# Patient Record
Sex: MALE | Race: WHITE | NOT HISPANIC OR LATINO | Employment: OTHER | URBAN - METROPOLITAN AREA
[De-identification: names, ages, dates, MRNs, and addresses within clinical notes are randomized per-mention and may not be internally consistent; named-entity substitution may affect disease eponyms.]

---

## 2017-01-23 RX ORDER — ESCITALOPRAM OXALATE 10 MG/1
TABLET ORAL
Qty: 90 TABLET | Refills: 1 | Status: SHIPPED | OUTPATIENT
Start: 2017-01-23 | End: 2017-02-21 | Stop reason: SDUPTHER

## 2017-01-23 RX ORDER — DONEPEZIL HYDROCHLORIDE 10 MG/1
TABLET, FILM COATED ORAL
Qty: 90 TABLET | Refills: 1 | Status: SHIPPED | OUTPATIENT
Start: 2017-01-23 | End: 2017-02-21 | Stop reason: SDUPTHER

## 2017-01-25 RX ORDER — QUETIAPINE FUMARATE 50 MG/1
TABLET, FILM COATED ORAL
Qty: 30 TABLET | Refills: 2 | Status: SHIPPED | OUTPATIENT
Start: 2017-01-25 | End: 2017-02-21 | Stop reason: SDUPTHER

## 2017-01-27 ENCOUNTER — CLINICAL SUPPORT (OUTPATIENT)
Dept: ELECTROPHYSIOLOGY | Facility: CLINIC | Age: 82
End: 2017-01-27
Payer: MEDICARE

## 2017-01-27 DIAGNOSIS — Z95.0 CARDIAC PACEMAKER IN SITU: ICD-10-CM

## 2017-01-27 DIAGNOSIS — I44.2 COMPLETE HEART BLOCK: ICD-10-CM

## 2017-01-27 PROCEDURE — 93280 PM DEVICE PROGR EVAL DUAL: CPT | Mod: S$GLB,,, | Performed by: INTERNAL MEDICINE

## 2017-02-21 ENCOUNTER — OFFICE VISIT (OUTPATIENT)
Dept: FAMILY MEDICINE | Facility: CLINIC | Age: 82
End: 2017-02-21
Payer: MEDICARE

## 2017-02-21 ENCOUNTER — TELEPHONE (OUTPATIENT)
Dept: FAMILY MEDICINE | Facility: CLINIC | Age: 82
End: 2017-02-21

## 2017-02-21 VITALS
HEART RATE: 68 BPM | WEIGHT: 117.19 LBS | DIASTOLIC BLOOD PRESSURE: 76 MMHG | HEIGHT: 70 IN | OXYGEN SATURATION: 95 % | TEMPERATURE: 98 F | BODY MASS INDEX: 16.78 KG/M2 | SYSTOLIC BLOOD PRESSURE: 124 MMHG

## 2017-02-21 DIAGNOSIS — G30.1 LATE ONSET ALZHEIMER'S DISEASE WITHOUT BEHAVIORAL DISTURBANCE: Chronic | ICD-10-CM

## 2017-02-21 DIAGNOSIS — F02.80 LATE ONSET ALZHEIMER'S DISEASE WITHOUT BEHAVIORAL DISTURBANCE: Chronic | ICD-10-CM

## 2017-02-21 DIAGNOSIS — F03.91 DEMENTIA WITH BEHAVIORAL DISTURBANCE, UNSPECIFIED DEMENTIA TYPE: ICD-10-CM

## 2017-02-21 DIAGNOSIS — J06.9 VIRAL URI: Primary | ICD-10-CM

## 2017-02-21 DIAGNOSIS — I10 ESSENTIAL HYPERTENSION: Chronic | ICD-10-CM

## 2017-02-21 DIAGNOSIS — Z48.02 ENCOUNTER FOR REMOVAL OF SUTURES: ICD-10-CM

## 2017-02-21 PROCEDURE — 99499 UNLISTED E&M SERVICE: CPT | Mod: S$GLB,,, | Performed by: FAMILY MEDICINE

## 2017-02-21 PROCEDURE — 99214 OFFICE O/P EST MOD 30 MIN: CPT | Mod: S$GLB,,, | Performed by: FAMILY MEDICINE

## 2017-02-21 PROCEDURE — 1126F AMNT PAIN NOTED NONE PRSNT: CPT | Mod: S$GLB,,, | Performed by: FAMILY MEDICINE

## 2017-02-21 PROCEDURE — 1157F ADVNC CARE PLAN IN RCRD: CPT | Mod: S$GLB,,, | Performed by: FAMILY MEDICINE

## 2017-02-21 PROCEDURE — 99999 PR PBB SHADOW E&M-EST. PATIENT-LVL III: CPT | Mod: PBBFAC,,, | Performed by: FAMILY MEDICINE

## 2017-02-21 PROCEDURE — 1159F MED LIST DOCD IN RCRD: CPT | Mod: S$GLB,,, | Performed by: FAMILY MEDICINE

## 2017-02-21 RX ORDER — MEMANTINE HYDROCHLORIDE 10 MG/1
10 TABLET ORAL 2 TIMES DAILY
Qty: 180 TABLET | Refills: 4 | Status: SHIPPED | OUTPATIENT
Start: 2017-02-21

## 2017-02-21 RX ORDER — LORATADINE 10 MG/1
10 TABLET ORAL DAILY PRN
Qty: 30 TABLET | Refills: 0 | Status: SHIPPED | OUTPATIENT
Start: 2017-02-21 | End: 2017-04-04

## 2017-02-21 RX ORDER — ESCITALOPRAM OXALATE 10 MG/1
10 TABLET ORAL DAILY
Qty: 90 TABLET | Refills: 1 | Status: SHIPPED | OUTPATIENT
Start: 2017-02-21

## 2017-02-21 RX ORDER — LORATADINE 10 MG/1
10 TABLET ORAL DAILY PRN
Qty: 30 TABLET | Refills: 0 | Status: SHIPPED | OUTPATIENT
Start: 2017-02-21 | End: 2017-02-21 | Stop reason: SDUPTHER

## 2017-02-21 RX ORDER — QUETIAPINE FUMARATE 50 MG/1
50 TABLET, FILM COATED ORAL DAILY
Qty: 30 TABLET | Refills: 2 | Status: SHIPPED | OUTPATIENT
Start: 2017-02-21 | End: 2017-05-22 | Stop reason: SDUPTHER

## 2017-02-21 RX ORDER — DONEPEZIL HYDROCHLORIDE 10 MG/1
10 TABLET, FILM COATED ORAL NIGHTLY
Qty: 90 TABLET | Refills: 1 | Status: SHIPPED | OUTPATIENT
Start: 2017-02-21

## 2017-02-21 RX ORDER — BENZONATATE 100 MG/1
100 CAPSULE ORAL 3 TIMES DAILY PRN
Qty: 30 CAPSULE | Refills: 0 | Status: SHIPPED | OUTPATIENT
Start: 2017-02-21 | End: 2017-04-04

## 2017-02-21 RX ORDER — FUROSEMIDE 20 MG/1
TABLET ORAL
Qty: 90 TABLET | Refills: 4 | Status: SHIPPED | OUTPATIENT
Start: 2017-02-21 | End: 2017-03-28

## 2017-02-21 RX ORDER — METOPROLOL TARTRATE 25 MG/1
12.5 TABLET, FILM COATED ORAL 2 TIMES DAILY
Qty: 180 TABLET | Refills: 4
Start: 2017-02-21 | End: 2017-05-09 | Stop reason: SDUPTHER

## 2017-02-21 RX ORDER — METOPROLOL TARTRATE 25 MG/1
25 TABLET, FILM COATED ORAL 2 TIMES DAILY
Qty: 180 TABLET | Refills: 4 | Status: CANCELLED | OUTPATIENT
Start: 2017-02-21
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
## 2017-02-21 NOTE — TELEPHONE ENCOUNTER
----- Message from Seoku Redding sent at 2/21/2017 11:19 AM CST -----  Contact: Cinthia / Wife  Pt's wife is requesting a call back regarding recent visit.  Please call Cinthia at .    Thanks

## 2017-02-27 ENCOUNTER — TELEPHONE (OUTPATIENT)
Dept: FAMILY MEDICINE | Facility: CLINIC | Age: 82
End: 2017-02-27

## 2017-02-27 DIAGNOSIS — G30.1 LATE ONSET ALZHEIMER'S DISEASE WITHOUT BEHAVIORAL DISTURBANCE: Primary | Chronic | ICD-10-CM

## 2017-02-27 DIAGNOSIS — F02.80 LATE ONSET ALZHEIMER'S DISEASE WITHOUT BEHAVIORAL DISTURBANCE: Primary | Chronic | ICD-10-CM

## 2017-02-27 NOTE — TELEPHONE ENCOUNTER
----- Message from Corrina Underwood sent at 2/24/2017  9:47 AM CST -----  Contact: patient wife called [phone]  Patient wife called stated pending walker and sachin.  Please give her a call back [phone]

## 2017-03-01 ENCOUNTER — DOCUMENTATION ONLY (OUTPATIENT)
Dept: ELECTROPHYSIOLOGY | Facility: CLINIC | Age: 82
End: 2017-03-01

## 2017-03-01 NOTE — PROGRESS NOTES
Patient spouse was contacted regarding his monthly checks via TTM. Patient spouse stated that she will call into the clinic no matter what regarding check. Awaiting patients call.

## 2017-03-02 ENCOUNTER — CLINICAL SUPPORT (OUTPATIENT)
Dept: ELECTROPHYSIOLOGY | Facility: CLINIC | Age: 82
End: 2017-03-02
Payer: MEDICARE

## 2017-03-02 DIAGNOSIS — I44.2 CHB (COMPLETE HEART BLOCK): ICD-10-CM

## 2017-03-02 DIAGNOSIS — Z95.0 CARDIAC PACEMAKER IN SITU: ICD-10-CM

## 2017-03-02 PROCEDURE — 93293 PM PHONE R-STRIP DEVICE EVAL: CPT | Mod: S$GLB,,, | Performed by: INTERNAL MEDICINE

## 2017-03-09 ENCOUNTER — TELEPHONE (OUTPATIENT)
Dept: FAMILY MEDICINE | Facility: CLINIC | Age: 82
End: 2017-03-09

## 2017-03-09 NOTE — TELEPHONE ENCOUNTER
----- Message from Emperatriz Castro sent at 3/9/2017  7:43 AM CST -----  Contact: self  Please contact Cinthia Sykes at [phone] in regards to getting referral for dentist to repair patient's dental plate.    Thanks!

## 2017-03-09 NOTE — TELEPHONE ENCOUNTER
----- Message from Bobby Ackerman sent at 3/8/2017  8:53 AM CST -----  Contact: Wife-Cinthia  Called to provide Authorization # for approval of pt's equipment. Authorization #-[id number] Wife can be reachad @ [phone].

## 2017-03-15 ENCOUNTER — TELEPHONE (OUTPATIENT)
Dept: FAMILY MEDICINE | Facility: CLINIC | Age: 82
End: 2017-03-15

## 2017-03-15 NOTE — TELEPHONE ENCOUNTER
Spoke w/patient's spouse, requesting referral to [facility] oral dental clinic to repair patient's top denture. States patient's top denture broke off and cut his top gums where stitches had to be applied.

## 2017-03-15 NOTE — TELEPHONE ENCOUNTER
----- Message from Monica Harrison sent at 3/15/2017  8:55 AM CDT -----  Contact: self  Pt needs a referral for [facility] phone number [phone] or fax [phone]. Thanks

## 2017-03-15 NOTE — TELEPHONE ENCOUNTER
I will not be able to refer her to [facility] dental clinic, they will have to establish care with them.

## 2017-03-28 ENCOUNTER — OFFICE VISIT (OUTPATIENT)
Dept: FAMILY MEDICINE | Facility: CLINIC | Age: 82
End: 2017-03-28
Payer: MEDICARE

## 2017-03-28 VITALS
HEART RATE: 116 BPM | OXYGEN SATURATION: 98 % | SYSTOLIC BLOOD PRESSURE: 98 MMHG | BODY MASS INDEX: 16.19 KG/M2 | HEIGHT: 70 IN | DIASTOLIC BLOOD PRESSURE: 56 MMHG | TEMPERATURE: 98 F | WEIGHT: 113.13 LBS

## 2017-03-28 DIAGNOSIS — Z23 NEED FOR PNEUMOCOCCAL VACCINATION: ICD-10-CM

## 2017-03-28 DIAGNOSIS — E86.0 DEHYDRATION: ICD-10-CM

## 2017-03-28 DIAGNOSIS — R53.1 WEAKNESS: Primary | ICD-10-CM

## 2017-03-28 PROCEDURE — 99999 PR PBB SHADOW E&M-EST. PATIENT-LVL III: CPT | Mod: PBBFAC,,, | Performed by: FAMILY MEDICINE

## 2017-03-28 PROCEDURE — 1157F ADVNC CARE PLAN IN RCRD: CPT | Mod: S$GLB,,, | Performed by: FAMILY MEDICINE

## 2017-03-28 PROCEDURE — 1159F MED LIST DOCD IN RCRD: CPT | Mod: S$GLB,,, | Performed by: FAMILY MEDICINE

## 2017-03-28 PROCEDURE — 99214 OFFICE O/P EST MOD 30 MIN: CPT | Mod: S$GLB,,, | Performed by: FAMILY MEDICINE

## 2017-03-28 PROCEDURE — 1160F RVW MEDS BY RX/DR IN RCRD: CPT | Mod: S$GLB,,, | Performed by: FAMILY MEDICINE

## 2017-03-28 PROCEDURE — 1126F AMNT PAIN NOTED NONE PRSNT: CPT | Mod: S$GLB,,, | Performed by: FAMILY MEDICINE
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
## 2017-03-30 ENCOUNTER — TELEPHONE (OUTPATIENT)
Dept: ADMINISTRATIVE | Facility: CLINIC | Age: 82
End: 2017-03-30

## 2017-04-04 ENCOUNTER — OFFICE VISIT (OUTPATIENT)
Dept: FAMILY MEDICINE | Facility: CLINIC | Age: 82
End: 2017-04-04
Payer: MEDICARE

## 2017-04-04 ENCOUNTER — TELEPHONE (OUTPATIENT)
Dept: FAMILY MEDICINE | Facility: CLINIC | Age: 82
End: 2017-04-04

## 2017-04-04 ENCOUNTER — HOSPITAL ENCOUNTER (OUTPATIENT)
Dept: RADIOLOGY | Facility: HOSPITAL | Age: 82
Discharge: HOME | End: 2017-04-04
Attending: NURSE PRACTITIONER
Payer: MEDICARE

## 2017-04-04 VITALS
HEART RATE: 66 BPM | SYSTOLIC BLOOD PRESSURE: 92 MMHG | DIASTOLIC BLOOD PRESSURE: 50 MMHG | HEIGHT: 70 IN | TEMPERATURE: 97 F | BODY MASS INDEX: 16.72 KG/M2 | OXYGEN SATURATION: 95 % | WEIGHT: 116.75 LBS
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
## 2017-04-04 DIAGNOSIS — R07.89 LEFT-SIDED CHEST WALL PAIN: ICD-10-CM

## 2017-04-04 PROCEDURE — 71020 XR CHEST PA AND LATERAL: CPT | Mod: TC,PO

## 2017-04-04 PROCEDURE — 99214 OFFICE O/P EST MOD 30 MIN: CPT | Mod: S$GLB,,, | Performed by: NURSE PRACTITIONER

## 2017-04-04 PROCEDURE — 1157F ADVNC CARE PLAN IN RCRD: CPT | Mod: S$GLB,,, | Performed by: NURSE PRACTITIONER

## 2017-04-04 PROCEDURE — 71020 XR CHEST PA AND LATERAL: CPT | Mod: 26,,, | Performed by: RADIOLOGY

## 2017-04-04 PROCEDURE — 1160F RVW MEDS BY RX/DR IN RCRD: CPT | Mod: S$GLB,,, | Performed by: NURSE PRACTITIONER

## 2017-04-04 PROCEDURE — 1159F MED LIST DOCD IN RCRD: CPT | Mod: S$GLB,,, | Performed by: NURSE PRACTITIONER

## 2017-04-04 PROCEDURE — 1125F AMNT PAIN NOTED PAIN PRSNT: CPT | Mod: S$GLB,,, | Performed by: NURSE PRACTITIONER

## 2017-04-04 PROCEDURE — 99999 PR PBB SHADOW E&M-EST. PATIENT-LVL III: CPT | Mod: PBBFAC,,, | Performed by: NURSE PRACTITIONER

## 2017-04-04 NOTE — TELEPHONE ENCOUNTER
Spoke with daughter and the patient is still SOB and hurting on side of fall and then stated that he coughed up some blood, spoke with MAXWELL Ramsey, and was advised to have patient go to the er for evaluation and possible CT to rule out a blood clot. Daughter was taking the patient there now.

## 2017-04-04 NOTE — PATIENT INSTRUCTIONS
Uncertain Causes of Fall  You have had a fall today. But the cause of your fall is not certain. Falls can occur due to slipping, tripping or losing your balance. A fall can also occur from a fainting spell or seizure.  While a fall can happen for a simple reason (tripping over something), falls in elderly people are often caused by a combination of things:  · Age-related decline in function with worsening balance, stability, vision, and muscle strength  · Chronic illness such as heart arrhythmias, heart valve disease, vascular disease, COPD, diabetes, strokes, arthritis  · Effects or side effects of medicines  · Dehydration.  · Environmental hazards such as uneven or slippery ground, unfamiliar place, obstacles, uneven surfaces, or slippery ground  · Situational factors (related to the activity being done, e.g., rushing to the bathroom)  Because the cause of your fall today is not certain, it is possible that a fainting spell or seizure was the cause. This means that it could happen again, without warning. If you fall again, without a cause, then you should return to this facility promptly to have further tests. Otherwise, follow up with your doctor as explained below.  It is normal to feel sore and tight in your muscles and back the next day, and not just the muscles you initially injured. Remember, all the parts of your body are connected, so while initially one area hurts, the next day another may hurt. Also, when you injure yourself, it causes inflammation, which then causes the muscles to tighten up and hurt more. After the initial worsening, it should gradually improve over the next few days. However, more severe pain should be reported.  Even without a definite head injury, you can still get a concussion. Concussions and even bleeding can still occur, especially if you have had a recent injury or take blood thinner medicine. It is not unusual to have a mild headache and feel tired and even nauseous or  dizzy.  Home care  · Rest today and resume your normal activities as soon as you are feeling back to normal. It is best to remain with someone who can check on you for the next 24 hours to watch for another episode of falling.  · If you were injured during the fall, follow the advice from your doctor regarding care of your injury.  ·  If you become light-headed or dizzy, lie down immediately or sit and lean forward with your head down.  · As a precaution, do not drive a car or operate dangerous equipment, do not take a bath alone (use a shower instead) and do not swim alone until you see your doctor. A condition causing fainting or seizures must be ruled out before resuming these activities.  · You may use acetaminophen or ibuprofen to control pain, unless another pain medicine was prescribed. If you have chronic liver or kidney disease or ever had a stomach ulcer or gastrointestinal bleeding, talk with your doctor before using these medicines.  · Keep your appointments for any further testing that may have been scheduled for you.  Follow-up care  Follow up with your healthcare provider, or as advised.  If X-rays or CT scan were done, you will be notified if there is a change in the reading, especially if it affects treatment.  Call 911  Call 911 if any of these occur:  · Trouble breathing  · Confused or difficulty arousing  · Fainting or loss of consciousness  · Rapid or very slow heart rate  · Seizure  · Difficulty with speech or vision, weakness of an arm or leg  · Difficulty walking or talking, loss of balance, numbness or weakness in one side of your body, facial droop  When to seek medical advice  Call your healthcare provider right away if any of these occur:  · Another unexplained fall  · Dizziness  · Severe headache  · Nausea and vomiting  · Blood in vomit, stools (black or red color)  Date Last Reviewed: 11/5/2015  © 3696-6798 [employer]. [address] [place] [address], [place] [address]. All rights  reserved. This information is not intended as a substitute for professional medical care. Always follow your healthcare professional's instructions.        Preventing Falls: Are You At Risk of Falling?  As you get older, you're not as steady on your feet as you once were. And you may have health problems you didn't have when you were younger. So, it's not surprising that older people are more likely to trip and fall. Falling can be very serious. It can change your overall health and quality of life. That's why it's important to be aware of your own risk of falling.    The dangers of falling  Falls are one of the main causes of injury in people over age 65. An older person who falls may take longer to get better than a younger person. And, after a fall, an older person is more likely to have problems that don't go away. So, preventing falls can help you avoid serious health problems.  Are you at risk of falling?  Answer these questions to rate your level of risk.  · Are you a woman?  · Have you fallen or stumbled in the last year?  · Are you over age 65?  · Are you ever dizzy or lightheaded with standing?  · Do you have a hard time getting in and out of the bathtub or on and off the toilet?  · Do you lean on objects to help you get around? Or do you use a cane or walker?  · Do you have vision or hearing problems? For example, do you need new glasses or hearing aids?  · Do you have 2 or more long-lasting (chronic) medical conditions?  · Do you take 3 or more medicines?  · Have you felt depressed recently?  · Have you had more trouble with your memory in recent months?  · Are there hazards in your home that might cause you to fall, such as loose rugs or poor lighting?  · Do you have a pet that jumps on you or might trip you?  · Have you stopped getting regular exercise?  · Do you have diabetes?   · Do you have a neurologic disease, such as Parkinson or Alzheimer disease?   · Do you drink alcohol?  · Do you wear  "athletic shoes or slippers, or go barefoot at home?  You can help prevent falls  If you answered "yes" to any of the above questions, you should take steps to reduce your risk of a fall. Monitoring health conditions and keeping walkways in your home free of clutter are just 2 ways. Changing is sometimes easier said than done. But keep in mind that even small changes can make you less likely to fall.  The fear of falling  It's normal to be scared of falling, especially if you've fallen before. But being afraid can actually make you more likely to fall. This is because:  · Fear might cause you to become less active. Being less active can lead to a loss of strength and balance.  · Fear can lead to isolation from others, depression, or the use of more medicines or alcohol. And all these things make falling even more likely.  To break the cycle, learn more about ways to avoid falling. As you take control, you may find yourself feeling less afraid.   Date Last Reviewed: 6/12/2015  © 7848-0785 [employer]. [address] [place] [address], [place] [address]. All rights reserved. This information is not intended as a substitute for professional medical care. Always follow your healthcare professional's instructions.        "
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
## 2017-04-05 ENCOUNTER — CLINICAL SUPPORT (OUTPATIENT)
Dept: ELECTROPHYSIOLOGY | Facility: CLINIC | Age: 82
End: 2017-04-05
Payer: MEDICARE

## 2017-04-05 DIAGNOSIS — I44.2 CHB (COMPLETE HEART BLOCK): ICD-10-CM

## 2017-04-05 DIAGNOSIS — Z95.0 CARDIAC PACEMAKER IN SITU: Primary | ICD-10-CM

## 2017-04-05 DIAGNOSIS — Z95.0 CARDIAC PACEMAKER IN SITU: ICD-10-CM

## 2017-04-05 PROCEDURE — 93293 PM PHONE R-STRIP DEVICE EVAL: CPT | Mod: S$GLB,,, | Performed by: INTERNAL MEDICINE

## 2017-04-07 ENCOUNTER — TELEPHONE (OUTPATIENT)
Dept: FAMILY MEDICINE | Facility: CLINIC | Age: 82
End: 2017-04-07

## 2017-04-10 ENCOUNTER — OFFICE VISIT (OUTPATIENT)
Dept: FAMILY MEDICINE | Facility: CLINIC | Age: 82
End: 2017-04-10
Payer: MEDICARE

## 2017-04-10 VITALS
SYSTOLIC BLOOD PRESSURE: 128 MMHG | OXYGEN SATURATION: 93 % | TEMPERATURE: 98 F | WEIGHT: 114.88 LBS | DIASTOLIC BLOOD PRESSURE: 62 MMHG | BODY MASS INDEX: 16.45 KG/M2 | HEIGHT: 70 IN | HEART RATE: 66 BPM
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
## 2017-04-10 DIAGNOSIS — Z09 FOLLOW-UP EXAM: Primary | ICD-10-CM

## 2017-04-10 PROCEDURE — 1157F ADVNC CARE PLAN IN RCRD: CPT | Mod: S$GLB,,, | Performed by: NURSE PRACTITIONER

## 2017-04-10 PROCEDURE — 99999 PR PBB SHADOW E&M-EST. PATIENT-LVL III: CPT | Mod: PBBFAC,,, | Performed by: NURSE PRACTITIONER

## 2017-04-10 PROCEDURE — 1160F RVW MEDS BY RX/DR IN RCRD: CPT | Mod: S$GLB,,, | Performed by: NURSE PRACTITIONER

## 2017-04-10 PROCEDURE — 1159F MED LIST DOCD IN RCRD: CPT | Mod: S$GLB,,, | Performed by: NURSE PRACTITIONER

## 2017-04-10 PROCEDURE — 99214 OFFICE O/P EST MOD 30 MIN: CPT | Mod: S$GLB,,, | Performed by: NURSE PRACTITIONER

## 2017-04-10 RX ORDER — ACETAMINOPHEN AND CODEINE PHOSPHATE 300; 30 MG/1; MG/1
TABLET ORAL
Refills: 0 | COMMUNITY
Start: 2017-04-04 | End: 2017-05-09
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
## 2017-04-11 ENCOUNTER — DOCUMENTATION ONLY (OUTPATIENT)
Dept: FAMILY MEDICINE | Facility: CLINIC | Age: 82
End: 2017-04-11

## 2017-04-11 NOTE — PROGRESS NOTES
Patient seen in clinic 04/04/2017 with imaging done. There were no fracture ribs noted on Xray. Patient presented to [facility]. Imaging done at 19:17 with imaging showing left sixth and seventh rib fractures.

## 2017-05-02 ENCOUNTER — DOCUMENTATION ONLY (OUTPATIENT)
Dept: ELECTROPHYSIOLOGY | Facility: CLINIC | Age: 82
End: 2017-05-02

## 2017-05-02 NOTE — PROGRESS NOTES
"Patient wife "Mrs.Lorrie Sykes"  was phoned on today regarding patient upcoming device check. No answer. Voice mail was left on my behalf informing patients wife the importance of coming to his upcoming appointments. Clinic number was provided for a return call.  "

## 2017-05-09 ENCOUNTER — CLINICAL SUPPORT (OUTPATIENT)
Dept: ELECTROPHYSIOLOGY | Facility: CLINIC | Age: 82
End: 2017-05-09
Payer: MEDICARE

## 2017-05-09 ENCOUNTER — OFFICE VISIT (OUTPATIENT)
Dept: ELECTROPHYSIOLOGY | Facility: CLINIC | Age: 82
End: 2017-05-09
Payer: MEDICARE

## 2017-05-09 ENCOUNTER — TELEPHONE (OUTPATIENT)
Dept: ELECTROPHYSIOLOGY | Facility: CLINIC | Age: 82
End: 2017-05-09

## 2017-05-09 ENCOUNTER — HOSPITAL ENCOUNTER (OUTPATIENT)
Dept: CARDIOLOGY | Facility: CLINIC | Age: 82
Discharge: HOME | End: 2017-05-09
Payer: MEDICARE

## 2017-05-09 VITALS
BODY MASS INDEX: 17.71 KG/M2 | WEIGHT: 123.69 LBS | HEART RATE: 69 BPM | DIASTOLIC BLOOD PRESSURE: 68 MMHG | SYSTOLIC BLOOD PRESSURE: 156 MMHG | HEIGHT: 70 IN

## 2017-05-09 DIAGNOSIS — Z95.0 CARDIAC PACEMAKER IN SITU: ICD-10-CM

## 2017-05-09 DIAGNOSIS — Z95.0 PACEMAKER: Chronic | ICD-10-CM

## 2017-05-09 DIAGNOSIS — I45.9 HEART BLOCK: ICD-10-CM

## 2017-05-09 DIAGNOSIS — I44.2 COMPLETE HEART BLOCK: ICD-10-CM

## 2017-05-09 DIAGNOSIS — I49.9 CARDIAC ARRHYTHMIA, UNSPECIFIED CARDIAC ARRHYTHMIA TYPE: ICD-10-CM

## 2017-05-09 DIAGNOSIS — I44.2 COMPLETE HEART BLOCK: Primary | ICD-10-CM

## 2017-05-09 DIAGNOSIS — I10 ESSENTIAL HYPERTENSION: Primary | Chronic | ICD-10-CM

## 2017-05-09 PROCEDURE — 99999 PR PBB SHADOW E&M-EST. PATIENT-LVL III: CPT | Mod: PBBFAC,,, | Performed by: INTERNAL MEDICINE

## 2017-05-09 PROCEDURE — 1159F MED LIST DOCD IN RCRD: CPT | Mod: S$GLB,,, | Performed by: INTERNAL MEDICINE

## 2017-05-09 PROCEDURE — 1126F AMNT PAIN NOTED NONE PRSNT: CPT | Mod: S$GLB,,, | Performed by: INTERNAL MEDICINE

## 2017-05-09 PROCEDURE — 93000 ELECTROCARDIOGRAM COMPLETE: CPT | Mod: S$GLB,,, | Performed by: INTERNAL MEDICINE

## 2017-05-09 PROCEDURE — 99499 UNLISTED E&M SERVICE: CPT | Mod: S$GLB,,, | Performed by: INTERNAL MEDICINE

## 2017-05-09 PROCEDURE — 99215 OFFICE O/P EST HI 40 MIN: CPT | Mod: S$GLB,,, | Performed by: INTERNAL MEDICINE

## 2017-05-09 PROCEDURE — 1160F RVW MEDS BY RX/DR IN RCRD: CPT | Mod: S$GLB,,, | Performed by: INTERNAL MEDICINE

## 2017-05-09 PROCEDURE — 93280 PM DEVICE PROGR EVAL DUAL: CPT | Mod: S$GLB,,, | Performed by: INTERNAL MEDICINE

## 2017-05-09 RX ORDER — POTASSIUM CHLORIDE 750 MG/1
TABLET, EXTENDED RELEASE ORAL
Qty: 90 TABLET | Refills: 3 | Status: SHIPPED | OUTPATIENT
Start: 2017-05-09

## 2017-05-09 RX ORDER — METOPROLOL TARTRATE 25 MG/1
12.5 TABLET, FILM COATED ORAL 2 TIMES DAILY
Qty: 90 TABLET | Refills: 3
Start: 2017-05-09
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
## 2017-05-09 NOTE — PROGRESS NOTES
Post-Procedure Patient Discharge Instructions  Pacemaker/Defibrillator  Wound Care   If you are discharged with a standard dressing over the incision, you may remove the dressing after 24 hours.    If you are discharged with an AQUACEL dressing, you should keep it on until your follow-up appointment in 1-2 weeks.   If there are Steri-strips (strips of tape) over your incision, leave them on until your follow-up appointment. They may begin to fall off on their own, which is normal. If there is Dermabond (clear glue) over your incision, do not scrub it off. It acts as a barrier and will eventually disappear.   You will be discharged with 5 days of oral antibiotics. Please take the full prescription until it is gone.   Do not get the incision wet for 48 hours following the procedure. You may sponge bath during this period, working around the incision. After 48 hours, you may shower, but you should still try to keep this area as dry as possible, and avoid direct water contact to the incision (allow the water to hit back of your shoulder rather than directly on the incision). Gently pat the incision dry if it does get wet.   You may take regular showers after 2 weeks, unless otherwise indicated at your follow-up visit.   Do not submerge the incision in a tub, pool, or body of water for 6 weeks.   Avoid using deodorants, powders, creams, lotions, etc. on your incision for 6 weeks.   If you notice unusual swelling, redness, drainage, have more device site pain, chest pain, shortness of breath, or have a fever greater than 100 degrees, call our device clinic immediately: [phone] or [phone] during normal office hours. You may call [phone] after-hours or on weekends and ask for the electrophysiologist on call.  Activity    If you only had a battery/generator change performed, there are no postoperative activity restrictions.    If this is your first device or if you had new wires added to your  existing device, then you will be discharged in a sling which you should wear continuously for 48 hours. After that, the sling should remain off during the day but should be worn at night for another 2-4 weeks (depending on how active a sleeper you are).   Do not raise your device-side arm above your shoulder for 6 weeks. Do not lift more than 5-10 lbs with your device-side arm for 6 weeks.    If you were driving prior to the procedure, you may resume driving after your first follow-up appointment (1-2 weeks). If you have a history of passing out or a history of certain arrhythmias, there may be driving restrictions unrelated to the procedure. Please clarify with your physician if this is the case.   No heavy activity with the affected arm for 6 weeks (eg. tennis, golf, bowling, aerobics, mowing the lawn, etc.).   Avoid rough contact at the device site for 6 weeks.   You may participate in sexual activity unless otherwise instructed.   You may return to work within 3-5 days unless told otherwise, provided you adhere to the above activity restrictions.  Long-Term Instructions   Keep your pacemaker or defibrillator identification card with you at all times.   If you have a defibrillator and you get shocked by the device: If you receive one shock and you feel ok, you may call [phone] or [phone] during normal office hours. You may call [phone] after-hours. If you receive one shock and you do not feel well, call Emergency Medical Services. They will take you to the nearest emergency room.   If you have a defibrillator and you get more than one shock from the device or multiple shocks in a short period of time: Call Emergency Medical Services. They will take you to the nearest emergency room.   Appliances: You may operate any electrical device in your home, including microwaves.   Security Systems: Electromagnetic security systems can be located in the workplace, courthouses, or other  high-security areas. Exposure to this type of security system has been shown to cause interference in some cases. Interference may be related to the duration of exposure and/or the distance between the device and the security device. You should be aware of the location of security systems, move through them at a normal pace, and avoid leaning or standing too close.   Metal Detectors at Airports: Metal detectors at airports can potentially interfere with pacemakers or defibrillators, although this is unlikely. Metal detectors will likely be triggered by your device and therefore at places such as airports  it will be important for you to carry your identification card for the pacemaker/defibrillator. Airport personnel will likely prefer to do a manual search.   Cellular Phones: It is unlikely that using a cellular phone will interfere with your device. It should be used with the hand opposite to the side where your device was implanted. The phone should not be carried in the shirt pocket on the same side as the device.   Specific Work Conditions: Patients who work near high-voltage lines, transmitting towers, large motors, welding equipment, or powerful magnets should discuss their specific situation with their physician. In general, remain at least two feet from external electrical equipment, verify that the equipment is properly grounded, and wear insulated gloves when using electrical devices. Leave the immediate location if lightheadedness or other symptoms develop.   MRI: Some pacemakers and defibrillators are safe in MRI scanners, while others are not. Please consult with your physician to see if you have an MRI-compatible device.   Surgery: Should you require surgery in the future, some electrosurgical devices can interfere with your device function. You should discuss this with your surgeon before any operation.   Radiation Therapy: If you ever require radiation therapy in the future, care must be taken  to avoid irradiating the device.  Long-Term Follow-Up   Your device has the ability to transmit device information from home to the doctors office using a home monitoring system.   This remote system takes the place of a doctors visit. Your device will be checked from home every 3-6 months. Every 6-12 months, you will be asked to come into the office for an in-office check.   Your device should last in the range of 6-12 years. This depends on many factors including how often it paces the heart.   When the battery is low, a generator change will be performed. This is a same-day procedure with no post-op activity restrictions, unless one of the pacemaker or defibrillator leads needs to be replaced at that time, or a new lead is added to your existing system.

## 2017-05-09 NOTE — TELEPHONE ENCOUNTER
"Patient's wife Rachel contacted the Device Clinic on this am in relation to a scheduled in clinic Pacemaker check on this afternoon.  Rachel stated patient now qualifies for transportation, however she would have to contact the service ahead of time and wanted to reschedule his appointment to another day.  Informed Rachel the scheduled appointment on today can not be done over the phone and really should not be rescheduled unless absolutely necessary.  Informed Rachel, patient's wife patient's pacemaker nearing recommended replacement and he is "Pacemaker Dependent".  The call dropped.  I then attempted to contact Rachel and on the second attempt she did answer the phone and stated she can only bring him into the clinic on this morning.  Instructed Rachel to bring the patient into the clinic for 9:30.  Rachel verbalized understanding to all of the above and stated she was getting the patient ready and will then bring him to the Device Clinic.  "

## 2017-05-09 NOTE — PROGRESS NOTES
IMPLANTABLE DEVICE EDUCATION CHECKLIST    5/22/17 @ 9 AM  REPORT TO CARDIOLOGY WAITING ROOM ON 3RD FLOOR OF HOSPITAL (DO NOT REPORT TO CLINIC)  Directions for Reporting to Cardiology Waiting Area in the Hospital  If you park in the Parking Garage:  Take elevators to the 2nd floor  Walk up ramp and turn right by Gold Elevators  Take elevator to the 3rd floor  Upon exiting the elevator, turn away from the clinic areas  Walk long ahuja around to front of hospital to area with windows overlooking [facility]  Check in at Reception Desk  OR  If family is dropping you off:  Have them drop you off at the front of the Hospital  (Near the ER, where all the flags are hung).  Take the E elevators to the 3rd floor.  Check in at the Reception Desk in the waiting room.    WASH YOUR CHEST WITH HIBICLENS OR AN ANTIBACTERIAL SOAP (SUCH AS DIAL) ON THE NIGHT BEFORE AND THE MORNING OF YOUR PROCEDURE.    DO NOT EAT OR DRINK ANYTHING AFTER: 12 MN ON THE NIGHT BEFORE YOUR PROCEDURE    MEDICATIONS  YOU MAY TAKE OTHER USUAL MORNING MEDICATIONS WITH A SIP OF WATER    YOU WILL BE GOING HOME AFTER YOUR PROCEDURE  YOU WILL NEED SOMEONE TO DRIVE YOU HOME AFTER YOUR PROCEDURE    YOUR PAIN DURING YOUR PROCEDURE WILL BE MANAGED BY THE SEDATION NURSE    THE ABOVE INSTRUCTIONS WERE GIVEN TO THE PATIENT VERBALLY AND THEY VERBALIZED UNDERSTANDING. THEY DO NOT REQUIRE ANY SPECIAL NEEDS AND DO NOT HAVE ANY LEARNING BARRIERS.     Any need to reschedule or cancel procedures, or any questions regarding your procedures should be addressed directly with the Arrhythmia Department Nurses at the following phone number: [phone]

## 2017-05-15 ENCOUNTER — OFFICE VISIT (OUTPATIENT)
Dept: FAMILY MEDICINE | Facility: CLINIC | Age: 82
End: 2017-05-15
Payer: MEDICARE

## 2017-05-15 VITALS
OXYGEN SATURATION: 96 % | SYSTOLIC BLOOD PRESSURE: 124 MMHG | WEIGHT: 124.25 LBS | DIASTOLIC BLOOD PRESSURE: 66 MMHG | BODY MASS INDEX: 17.79 KG/M2 | HEART RATE: 67 BPM | HEIGHT: 70 IN | TEMPERATURE: 98 F

## 2017-05-15 DIAGNOSIS — R06.02 SHORTNESS OF BREATH: Primary | ICD-10-CM

## 2017-05-15 PROCEDURE — 99999 PR PBB SHADOW E&M-EST. PATIENT-LVL III: CPT | Mod: PBBFAC,,, | Performed by: NURSE PRACTITIONER

## 2017-05-15 PROCEDURE — 1160F RVW MEDS BY RX/DR IN RCRD: CPT | Mod: S$GLB,,, | Performed by: NURSE PRACTITIONER

## 2017-05-15 PROCEDURE — 1126F AMNT PAIN NOTED NONE PRSNT: CPT | Mod: S$GLB,,, | Performed by: NURSE PRACTITIONER

## 2017-05-15 PROCEDURE — 99214 OFFICE O/P EST MOD 30 MIN: CPT | Mod: S$GLB,,, | Performed by: NURSE PRACTITIONER

## 2017-05-15 PROCEDURE — 1159F MED LIST DOCD IN RCRD: CPT | Mod: S$GLB,,, | Performed by: NURSE PRACTITIONER
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
## 2017-05-22 ENCOUNTER — HOSPITAL ENCOUNTER (OUTPATIENT)
Facility: HOSPITAL | Age: 82
Discharge: HOME | End: 2017-05-22
Attending: INTERNAL MEDICINE | Admitting: INTERNAL MEDICINE
Payer: MEDICARE

## 2017-05-22 VITALS
RESPIRATION RATE: 16 BRPM | TEMPERATURE: 98 F | OXYGEN SATURATION: 100 % | HEIGHT: 72 IN | SYSTOLIC BLOOD PRESSURE: 173 MMHG | DIASTOLIC BLOOD PRESSURE: 80 MMHG | HEART RATE: 63 BPM | WEIGHT: 132 LBS | BODY MASS INDEX: 17.88 KG/M2

## 2017-05-22 DIAGNOSIS — H25.10 AGE-RELATED NUCLEAR CATARACT: ICD-10-CM

## 2017-05-22 DIAGNOSIS — I50.9 HEART FAILURE: ICD-10-CM

## 2017-05-22 DIAGNOSIS — I50.9 CHF (CONGESTIVE HEART FAILURE): ICD-10-CM

## 2017-05-22 DIAGNOSIS — I45.9 HEART BLOCK: Primary | ICD-10-CM

## 2017-05-22 PROCEDURE — 93005 ELECTROCARDIOGRAM TRACING: CPT

## 2017-05-22 PROCEDURE — 93010 ELECTROCARDIOGRAM REPORT: CPT | Mod: ,,, | Performed by: INTERNAL MEDICINE

## 2017-05-22 PROCEDURE — 33228 REMV&REPLC PM GEN DUAL LEAD: CPT | Mod: ,,, | Performed by: INTERNAL MEDICINE

## 2017-05-22 PROCEDURE — 99152 MOD SED SAME PHYS/QHP 5/>YRS: CPT

## 2017-05-22 PROCEDURE — 99152 MOD SED SAME PHYS/QHP 5/>YRS: CPT | Mod: ,,, | Performed by: INTERNAL MEDICINE

## 2017-05-22 RX ORDER — CEFAZOLIN SODIUM 2 G/50ML
2 SOLUTION INTRAVENOUS
Status: DISCONTINUED | OUTPATIENT
Start: 2017-05-22 | End: 2017-05-22 | Stop reason: HOSPADM

## 2017-05-22 RX ORDER — CEPHALEXIN 500 MG/1
500 CAPSULE ORAL EVERY 8 HOURS
Qty: 15 CAPSULE | Refills: 0 | Status: SHIPPED | OUTPATIENT
Start: 2017-05-22
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
## 2017-05-22 NOTE — NURSING
Pt verbalized an understanding of discharge instructions including diet, s/s to notify md, post procedure care, and follow up in 1 week for wound check.  No driving or operating dangerous machinery for 24 hours.  Keep incision dry for 36 hours.  Take off Coverderm in 24 hours.  Left chest wall clean, dry and intact. No bleeding, no hematoma.  Pt refused wheel chair and ambulated off unit with spouse.

## 2017-05-22 NOTE — DISCHARGE SUMMARY
[facility]  Discharge Note  Short Stay    Admit Date: 5/22/2017    Discharge Date and Time: 05/22/2017     Attending Physician: Jose Larsen MD     Discharge Provider: Crow Davis    Diagnoses:  Active Hospital Problems    Diagnosis  POA    *CHF (congestive heart failure) [I50.9]  Yes      Resolved Hospital Problems    Diagnosis Date Resolved POA   No resolved problems to display.       Discharged Condition: good    Hospital Course: Patient was admitted for an outpatient procedure and tolerated the procedure well with no complications.    Final Diagnoses: Same as principal problem.    Disposition: Home or Self Care    Follow up/Patient Instructions:    Medications:  Reconciled Home Medications:   Current Discharge Medication List      START taking these medications    Details   cephALEXin (KEFLEX) 500 MG capsule Take 1 capsule (500 mg total) by mouth every 8 (eight) hours.  Qty: 15 capsule, Refills: 0         CONTINUE these medications which have NOT CHANGED    Details   donepezil (ARICEPT) 10 MG tablet Take 1 tablet (10 mg total) by mouth every evening.  Qty: 90 tablet, Refills: 1    Associated Diagnoses: Late onset Alzheimer's disease without behavioral disturbance      escitalopram oxalate (LEXAPRO) 10 MG tablet Take 1 tablet (10 mg total) by mouth once daily.  Qty: 90 tablet, Refills: 1    Associated Diagnoses: Dementia with behavioral disturbance, unspecified dementia type      memantine (NAMENDA) 10 MG Tab Take 1 tablet (10 mg total) by mouth 2 (two) times daily.  Qty: 180 tablet, Refills: 4    Associated Diagnoses: Late onset Alzheimer's disease without behavioral disturbance      metoprolol tartrate (LOPRESSOR) 25 MG tablet Take 0.5 tablets (12.5 mg total) by mouth 2 (two) times daily.  Qty: 90 tablet, Refills: 3    Associated Diagnoses: Essential hypertension; Pacemaker      potassium chloride SA (K-DUR,KLOR-CON) 10 MEQ tablet TAKE 1 TABLET ONE TIME DAILY  Qty: 90 tablet, Refills: 3     Associated Diagnoses: Essential hypertension      quetiapine (SEROQUEL) 50 MG tablet Take 1 tablet (50 mg total) by mouth once daily.  Qty: 30 tablet, Refills: 2           No discharge procedures on file.  Follow-up Information     [facility] - Arrhythmia In 1 week.    Specialty:  Cardiology  Why:  For wound re-check  Contact information:  [address]  [facility] [address] [phone]  Additional information:  [facility] [address] [facility]           Jose Larsen MD In 3 months.    Specialties:  Electrophysiology, Cardiovascular Disease  Contact information:  [address]  [facility] [id number] [phone]                   Discharge Procedure Orders (must include Diet, Follow-up, Activity):  No discharge procedures on file.

## 2017-05-22 NOTE — INTERVAL H&P NOTE
"The patient has been examined and the H&P has been reviewed:    I concur with the findings and no changes have occurred since H&P was written. pt states " I feel wonderful."  Denies any acute symptoms such as bleeding,  chest pains, syncope, SOB.     - DC PPM generator replacement at [facility]  -ASA class III    Prior to procedure, we discussed the alternatives, benefits and risks of the procedure including pain, infection, bleeding,  heart attack,  and death.The patient and spouse voices understanding and all questions have been answered. No further questions/concerns voiced at this time.      DEDE Chris-BC  Cardiology Electrophysiology  NP   [facility]    Attending: MD Chava Josue           Active Hospital Problems    Diagnosis  POA    CHF (congestive heart failure) [I50.9]  Yes      Resolved Hospital Problems    Diagnosis Date Resolved POA   No resolved problems to display.     "

## 2017-05-22 NOTE — PLAN OF CARE
Problem: Patient Care Overview  Goal: Plan of Care Review  Outcome: Ongoing (interventions implemented as appropriate)  Received report from Keri. Patient s/p gen change, AAOx3. VSS, no c/o pain or discomfort at this time, resp even and unlabored. Mepilex dressing to L upper chest wall is CDI. No active bleeding. No hematoma noted. Post procedure protocol reviewed with patient and patient's family. Understanding verbalized. Family members at bedside. Nurse call bell within reach. Will continue to monitor per post procedure protocol.

## 2017-05-23 DIAGNOSIS — Z95.0 CARDIAC PACEMAKER IN SITU: ICD-10-CM

## 2017-05-23 DIAGNOSIS — I50.9 CONGESTIVE HEART FAILURE, UNSPECIFIED CONGESTIVE HEART FAILURE CHRONICITY, UNSPECIFIED CONGESTIVE HEART FAILURE TYPE: ICD-10-CM

## 2017-05-23 DIAGNOSIS — I45.9 HEART BLOCK: Primary | ICD-10-CM

## 2017-05-23 RX ORDER — QUETIAPINE FUMARATE 50 MG/1
TABLET, FILM COATED ORAL
Qty: 30 TABLET | Refills: 0 | Status: SHIPPED | OUTPATIENT
Start: 2017-05-23 | End: 2017-05-23 | Stop reason: SDUPTHER

## 2017-05-23 RX ORDER — QUETIAPINE FUMARATE 50 MG/1
50 TABLET, FILM COATED ORAL DAILY
Qty: 30 TABLET | Refills: 0 | Status: SHIPPED | OUTPATIENT
Start: 2017-05-23 | End: 2017-06-30 | Stop reason: SDUPTHER

## 2017-05-23 NOTE — TELEPHONE ENCOUNTER
----- Message from Natalie Flores sent at 5/23/2017  1:21 PM CDT -----  Contact: Wife- Cinthia  Patient need refill. Please call Patient's wife  at [phone]    quetiapine (SEROQUEL) 50 MG tablet

## 2017-05-26 ENCOUNTER — OFFICE VISIT (OUTPATIENT)
Dept: FAMILY MEDICINE | Facility: CLINIC | Age: 82
End: 2017-05-26
Payer: MEDICARE

## 2017-05-26 VITALS
SYSTOLIC BLOOD PRESSURE: 149 MMHG | HEIGHT: 72 IN | DIASTOLIC BLOOD PRESSURE: 82 MMHG | TEMPERATURE: 98 F | OXYGEN SATURATION: 93 % | WEIGHT: 122.25 LBS | BODY MASS INDEX: 16.56 KG/M2 | HEART RATE: 71 BPM

## 2017-05-26 DIAGNOSIS — G30.1 LATE ONSET ALZHEIMER'S DISEASE WITHOUT BEHAVIORAL DISTURBANCE: Chronic | ICD-10-CM

## 2017-05-26 DIAGNOSIS — Z95.0 PACEMAKER: Chronic | ICD-10-CM

## 2017-05-26 DIAGNOSIS — F02.80 LATE ONSET ALZHEIMER'S DISEASE WITHOUT BEHAVIORAL DISTURBANCE: Chronic | ICD-10-CM

## 2017-05-26 DIAGNOSIS — I10 ESSENTIAL HYPERTENSION: Primary | Chronic | ICD-10-CM

## 2017-05-26 PROCEDURE — 1159F MED LIST DOCD IN RCRD: CPT | Mod: S$GLB,,, | Performed by: FAMILY MEDICINE

## 2017-05-26 PROCEDURE — 99999 PR PBB SHADOW E&M-EST. PATIENT-LVL III: CPT | Mod: PBBFAC,,, | Performed by: FAMILY MEDICINE

## 2017-05-26 PROCEDURE — 99499 UNLISTED E&M SERVICE: CPT | Mod: S$GLB,,, | Performed by: FAMILY MEDICINE

## 2017-05-26 PROCEDURE — 99213 OFFICE O/P EST LOW 20 MIN: CPT | Mod: S$GLB,,, | Performed by: FAMILY MEDICINE

## 2017-05-26 PROCEDURE — 1126F AMNT PAIN NOTED NONE PRSNT: CPT | Mod: S$GLB,,, | Performed by: FAMILY MEDICINE
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
## 2017-06-01 ENCOUNTER — TELEPHONE (OUTPATIENT)
Dept: ELECTROPHYSIOLOGY | Facility: CLINIC | Age: 82
End: 2017-06-01

## 2017-06-01 NOTE — TELEPHONE ENCOUNTER
----- Message from Kathleen Perry sent at 6/1/2017  8:57 AM CDT -----  Contact: Pt called  Wife of pt called, requesting to reschedule missed wound check. She states morning appointment are best and will like to be reached at ph [phone]. She states pt is needing to schedule with transportation and needs a few days to book. Thank you

## 2017-06-05 ENCOUNTER — CLINICAL SUPPORT (OUTPATIENT)
Dept: ELECTROPHYSIOLOGY | Facility: CLINIC | Age: 82
End: 2017-06-05
Payer: MEDICARE

## 2017-06-05 DIAGNOSIS — I44.2 COMPLETE HEART BLOCK: ICD-10-CM

## 2017-06-05 DIAGNOSIS — Z95.0 CARDIAC PACEMAKER IN SITU: ICD-10-CM

## 2017-06-05 PROCEDURE — 93280 PM DEVICE PROGR EVAL DUAL: CPT | Mod: S$GLB,,, | Performed by: INTERNAL MEDICINE

## 2017-06-21 ENCOUNTER — TELEPHONE (OUTPATIENT)
Dept: FAMILY MEDICINE | Facility: CLINIC | Age: 82
End: 2017-06-21

## 2017-06-21 DIAGNOSIS — R53.1 WEAKNESS: Primary | ICD-10-CM

## 2017-06-21 NOTE — TELEPHONE ENCOUNTER
----- Message from Tasha Azar sent at 6/21/2017  8:25 AM CDT -----  Contact: Wife - Rachel Sykes (Spouse)  Pt wife requesting a wheel chair due to pt keeps on falling. Please call [phone].

## 2017-06-21 NOTE — TELEPHONE ENCOUNTER
Spoke w/patient's spouse, requesting a wheelchair for patient. States patient is weak and when he tries to ambulate, he falls. Please advise.

## 2017-06-30 RX ORDER — QUETIAPINE FUMARATE 50 MG/1
50 TABLET, FILM COATED ORAL DAILY
Qty: 30 TABLET | Refills: 0 | Status: SHIPPED | OUTPATIENT
Start: 2017-06-30

## 2017-06-30 NOTE — TELEPHONE ENCOUNTER
----- Message from Natalie Flores sent at 6/30/2017  9:36 AM CDT -----  Contact: Wife- Cinthia  Patient need a refill. Please call at [phone]    quetiapine (SEROQUEL) 50 MG tablet

## 2017-08-21 ENCOUNTER — TELEPHONE (OUTPATIENT)
Dept: FAMILY MEDICINE | Facility: CLINIC | Age: 82
End: 2017-08-21

## 2017-08-21 NOTE — TELEPHONE ENCOUNTER
Spoke w/Hafsa EDMONDSON, want to know if  will segh patient's death certificate. Patient passed away at home on 8/18/17 @ 22:04pm. Cause of death Cardiac-Natural Please advise.

## 2017-08-21 NOTE — TELEPHONE ENCOUNTER
----- Message from Natalie Flores sent at 8/21/2017  8:50 AM CDT -----  Contact: Kiran -[facility]'s office   Kiran with [facility]'s Office call to find out if Dr. Olivera would be willing to sign patient's Death certificate because he was found at home. Please call   [phone]

## 2017-08-21 NOTE — TELEPHONE ENCOUNTER
The physician that declared him decreased should sign the death certificate. Was he seen by anybody else?
